# Patient Record
Sex: FEMALE | Race: WHITE | NOT HISPANIC OR LATINO | Employment: OTHER | URBAN - METROPOLITAN AREA
[De-identification: names, ages, dates, MRNs, and addresses within clinical notes are randomized per-mention and may not be internally consistent; named-entity substitution may affect disease eponyms.]

---

## 2017-02-16 ENCOUNTER — OFFICE VISIT (OUTPATIENT)
Dept: OBSTETRICS AND GYNECOLOGY | Facility: CLINIC | Age: 77
End: 2017-02-16

## 2017-02-16 VITALS
WEIGHT: 115.2 LBS | DIASTOLIC BLOOD PRESSURE: 70 MMHG | SYSTOLIC BLOOD PRESSURE: 124 MMHG | BODY MASS INDEX: 21.75 KG/M2 | HEIGHT: 61 IN

## 2017-02-16 DIAGNOSIS — M85.80 OSTEOPENIA: ICD-10-CM

## 2017-02-16 DIAGNOSIS — Z78.0 MENOPAUSE: Primary | ICD-10-CM

## 2017-02-16 DIAGNOSIS — Z01.419 ENCOUNTER FOR GYNECOLOGICAL EXAMINATION WITHOUT ABNORMAL FINDING: ICD-10-CM

## 2017-02-16 DIAGNOSIS — N95.2 VAGINAL ATROPHY: ICD-10-CM

## 2017-02-16 PROCEDURE — 99397 PER PM REEVAL EST PAT 65+ YR: CPT | Performed by: OBSTETRICS & GYNECOLOGY

## 2017-02-16 RX ORDER — NITROGLYCERIN 0.4 MG/1
1 TABLET SUBLINGUAL AS NEEDED
Refills: 2 | COMMUNITY
Start: 2017-01-31

## 2017-02-16 NOTE — PROGRESS NOTES
"   Chief Complaint   Patient presents with   • Gynecologic Exam   • Spencer Crenshaw is a 77 y.o. year old  presenting to be seen for her annual exam.  This patient has been followed by Dr. Sukhjinder Lew.  She has previously had a vaginal hysterectomy, anterior-posterior repair in .  She is treated for vaginal atrophy, however she does not use her estrogen cream regularly.  She has a history of osteopenia of the hip, however we do not have any recent DEXA's available here.  She has bilateral breast implants.    SCREENING TESTS    Year 2012   Age                         PAP  Neg.                       HPV high risk                         Mammogram    benign                     RONY score                         Breast MRI                         Lipids                         Vitamin D                         Colonoscopy                         DEXA  Frax (hip/any)    \"osteopenia\"                     Ovarian Screen                           Enter the month test was performed.  If month not known, enter \"X'  · Black numbers = normal results  · Red numbers = abnormal results  · Black X = patient reported normal  · Red X - patient reported abnormal      Referred by:    Profession:    Other info:         History   Sexual Activity   • Sexual activity: Yes   • Partners: Male   • Birth control/ protection: Post-menopausal, Surgical    She would not like to be screened for STD's at today's exam.     She exercises regularly: yes.  She wears her seat belt: yes.  She has concerns about domestic violence: no.  She has noticed changes in height: no    GYN screening history:  · Last mammogram: was done on approximately 10/16/2015 and the result was: Birads II (Benign findings)..    No Additional Complaints Reported    The following portions of the patient's history were reviewed and updated as " "appropriate:vital signs and   She  does not have any pertinent problems on file.  She  has a past surgical history that includes Adenoidectomy; Tubal ligation; Cataract extraction; Vaginal hysterectomy w/ anterior and posterior vaginal repair; Tonsillectomy; and Breast Augmentation (Bilateral).  Her family history includes Arthritis in her father and mother; Diabetes in her paternal grandfather; Heart attack in her father.  She  reports that she has never smoked. She has never used smokeless tobacco. She reports that she does not drink alcohol or use illicit drugs.  Current Outpatient Prescriptions   Medication Sig Dispense Refill   • conjugated estrogens (PREMARIN) 0.625 MG/GM vaginal cream Insert 0.5 application into the vagina 2 (Two) Times a Week. 30 g 3   • aspirin 81 MG EC tablet Take 81 mg by mouth Daily.     • desmopressin (DDAVP) 0.2 MG tablet      • hydrocortisone (CORTEF) 5 MG tablet      • levothyroxine (SYNTHROID, LEVOTHROID) 50 MCG tablet      • nitroglycerin (NITROSTAT) 0.4 MG SL tablet Place 1 tablet under the tongue As Needed.  2   • omeprazole (priLOSEC) 40 MG capsule      • potassium chloride (MICRO-K) 10 MEQ CR capsule      • verapamil PM (VERELAN PM) 180 MG 24 hr capsule      • ZENPEP 69418 UNITS capsule delayed-release particles capsule        No current facility-administered medications for this visit.      She has No Known Allergies..    Review of Systems  A comprehensive review of systems was negative.  Constitutional: negative for fever, chills, activity change, appetite change, fatigue and unexpected weight change.  Respiratory: negative  Cardiovascular: negative  Gastrointestinal: negative  Genitourinary:negative  Musculoskeletal:negative  Behavioral/Psych: negative       Visit Vitals   • /70   • Ht 61\" (154.9 cm)   • Wt 115 lb 3.2 oz (52.3 kg)   • LMP  (LMP Unknown)   • BMI 21.77 kg/m2       Physical Exam    General:  alert; cooperative; well developed; well nourished   Skin:  No " suspicious lesions seen   Thyroid: normal to inspection and palpation   Lungs:  clear to auscultation bilaterally   Heart:  regular rate and rhythm, S1, S2 normal, no murmur, click, rub or gallop   Breasts:  Examined in supine position  Nipples normal without inversion, lesions or discharge  There are no palpable axillary nodes  Bilateral implants are noted without obvious palpable abnormalities  there are protrusions from the implants on the left at 11:00 and 1:00   Abdomen: soft, non-tender; no masses  no umbilical or inginual hernias are present  no hepato-splenomegaly   Pelvis: Clinical staff was present for exam  External genitalia:  normal appearance of the external genitalia including Bartholin's and Herald's glands.  Vaginal:  atrophic mucosal changes are present;  Cervix:  absent.  Uterus:  absent.  Adnexa:  non palpable bilaterally.  Rectal:  anus visually normal appearing. recto-vaginal exam unremarkable and confirms findings;     Lab Review   No data reviewed    Imaging  Mammogram results         ASSESSMENT  Problems Addressed this Visit        Musculoskeletal and Integument    Osteopenia       Genitourinary    Menopause - Primary    Vaginal atrophy    Relevant Medications    conjugated estrogens (PREMARIN) 0.625 MG/GM vaginal cream          PLAN    Medications prescribed this encounter:    New Medications Ordered This Visit   Medications   • nitroglycerin (NITROSTAT) 0.4 MG SL tablet     Sig: Place 1 tablet under the tongue As Needed.     Refill:  2   • conjugated estrogens (PREMARIN) 0.625 MG/GM vaginal cream     Sig: Insert 0.5 application into the vagina 2 (Two) Times a Week.     Dispense:  30 g     Refill:  3   ·   · Calcium, 600 mg/ Vit. D, 400 IU daily; regular weight-bearing exercise  · Follow up: 12 month(s)  *Please note that portions of this documentation may have been completed with a voice recognition program.  Efforts were made to edit this dictation, but occasional words may have been  mistranscribed.       This note was electronically signed.    LYNNE Muro MD  February 16, 2017  10:23 AM

## 2018-04-17 ENCOUNTER — OFFICE VISIT (OUTPATIENT)
Dept: NEUROLOGY | Facility: CLINIC | Age: 78
End: 2018-04-17

## 2018-04-17 VITALS
SYSTOLIC BLOOD PRESSURE: 116 MMHG | HEIGHT: 61 IN | WEIGHT: 115 LBS | DIASTOLIC BLOOD PRESSURE: 75 MMHG | BODY MASS INDEX: 21.71 KG/M2

## 2018-04-17 DIAGNOSIS — G44.229 CHRONIC TENSION-TYPE HEADACHE, NOT INTRACTABLE: Primary | ICD-10-CM

## 2018-04-17 PROCEDURE — 99204 OFFICE O/P NEW MOD 45 MIN: CPT | Performed by: PSYCHIATRY & NEUROLOGY

## 2018-04-17 RX ORDER — AMITRIPTYLINE HYDROCHLORIDE 10 MG/1
10 TABLET, FILM COATED ORAL NIGHTLY
Qty: 30 TABLET | Refills: 1 | Status: SHIPPED | OUTPATIENT
Start: 2018-04-17 | End: 2018-04-18 | Stop reason: SDUPTHER

## 2018-04-17 NOTE — PROGRESS NOTES
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  She reports that she has had the headaches associated with the dehydration caused by diabetes insipidus for more than 10 years but since last 1 year, after the diagnosis of bacteremia meningitis she has started having new type of headache that has become progressively worse in intensity, frequency and duration.  She reports that the headache feels like there is a wrap around her head, it is of dull aching type of pain with maximum intensity being 8 out of 10.  Current frequency is 25 headache days in a month with some headaches lasting for more than 3-4 hours.  She reports that she wakes up with dull headache and then it will progress to become more intense as the day goes on.  She reports minimal photosensitivity and no sensitivity to sound or nausea with these headaches.  She has tried Tylenol in the past for really bad headache and it usually helps.  She had to go to the ER a few weeks ago because headache became a really bad and underwent brain imaging which was unremarkable.  She was given IV medications which helped with the headache.  She does report poor sleep quality and irritable mood intermittently.  Because of the headache, she is not able to perform a routine physical activities as well as exercise.    The following portions of the patient's history were reviewed today and updated as appropriate: allergies, current medications, past family history, past medical history, past social history, past surgical history and problem list.  This document will be scanned to patient's chart.    Review of  "Systems   Constitutional: Positive for activity change and fatigue.   Eyes: Positive for pain and redness.   Respiratory: Negative.    Cardiovascular: Negative.    Musculoskeletal: Positive for back pain, myalgias, neck pain and neck stiffness.   Skin: Negative.    Allergic/Immunologic: Negative.    Neurological: Positive for dizziness, speech difficulty, light-headedness, numbness and headaches.   Hematological: Bruises/bleeds easily.   Psychiatric/Behavioral: Positive for sleep disturbance.     Objective:    /75   Ht 154 cm (60.63\")   Wt 52.2 kg (115 lb)   LMP  (LMP Unknown)   BMI 22.00 kg/m²     Neurology Exam:  General apperance: NAD.     Mental status: Alert, awake and oriented to time place and person.    Recent and Remote memory: Can recall 3/3 objects at 5 minutes. Can recall historical events.     Attention span and Concentration: Serial 7s: Normal.     Fund of knowledge:  Normal.     Language and Speech: No aphasia or dysarthria.    Naming , Repitition and Comprehension:  Can name objects, repeat a sentence and follow commands. Speech is clear and fluent with good repetition, comprehension, and naming.    CN II to XII: Intact.    Opthalmoscopic Exam: No papilledema.    Motor:  Right UE muscle strength 5/5. Normal tone.     Left UE muscle strength 5/5. Normal tone.      Right LE muscle strength5/5. Normal tone.     Left LE muscle strength 5/5. Normal tone.      Sensory: Normal light touch, vibration and pinprick sensation bilaterally.    DTRs: 2+ bilaterally.    Babinski: Negative bilaterally.    Co-ordination: Normal finger-to-nose, heel to shin B/L.    Rhomberg: Negative.    Gait: Normal.    Cardiovascular: Regular rate and rhythm without murmur, gallop or rub.    Assessment and Plan:  1. Chronic tension-type headache, not intractable  Start low-dose amitriptyline 5 mg at bedtime for 1 week and if no response then increase it to 10 mg at bedtime as a preventative therapy. This should help with " the sleep and mood as well.   It can be increased further on next follow-up visit based on the response.  Tylenol 500-1000 milligram as needed as an abortive therapy.     Return in about 3 weeks (around 5/8/2018).

## 2018-04-18 RX ORDER — AMITRIPTYLINE HYDROCHLORIDE 10 MG/1
10 TABLET, FILM COATED ORAL NIGHTLY
Qty: 30 TABLET | Refills: 1 | Status: SHIPPED | OUTPATIENT
Start: 2018-04-18 | End: 2018-06-19 | Stop reason: SINTOL

## 2018-05-08 ENCOUNTER — OFFICE VISIT (OUTPATIENT)
Dept: NEUROLOGY | Facility: CLINIC | Age: 78
End: 2018-05-08

## 2018-05-08 VITALS
SYSTOLIC BLOOD PRESSURE: 117 MMHG | HEIGHT: 61 IN | BODY MASS INDEX: 21.52 KG/M2 | DIASTOLIC BLOOD PRESSURE: 75 MMHG | WEIGHT: 114 LBS

## 2018-05-08 DIAGNOSIS — G44.229 CHRONIC TENSION-TYPE HEADACHE, NOT INTRACTABLE: Primary | ICD-10-CM

## 2018-05-08 PROCEDURE — 99213 OFFICE O/P EST LOW 20 MIN: CPT | Performed by: PSYCHIATRY & NEUROLOGY

## 2018-05-08 RX ORDER — PANCRELIPASE 36000; 180000; 114000 [USP'U]/1; [USP'U]/1; [USP'U]/1
CAPSULE, DELAYED RELEASE PELLETS ORAL
COMMUNITY
Start: 2018-05-05

## 2018-05-08 RX ORDER — POTASSIUM CHLORIDE 750 MG/1
CAPSULE, EXTENDED RELEASE ORAL
COMMUNITY
Start: 2018-05-02 | End: 2022-03-11

## 2018-05-08 NOTE — PROGRESS NOTES
Subjective:    CC: Larissa Crenshaw is in clinic today for regular follow up.     HPI:  Patient is in the clinic for regular follow-up.  Since her last visit, she reports that she did start amitriptyline 5 mg at bedtime and it the reduced her headaches to 50% in intensity and frequency.  However, it made her very tired and fatigued.  She also felt that she was retaining fluid and felt bloated.  It also improved her sleep quality.  She had to stop taking it to the 2-3 days ago because of the side effects.  Since stopping the medication though, the headaches have not become worse.  She is currently happy with the way things are going and would not like to restart the medication.    The following portions of the patient's history were reviewed and updated as of 05/08/2018 : allergies, social history and problem list.       Current Outpatient Prescriptions:   •  amitriptyline (ELAVIL) 10 MG tablet, Take 1 tablet by mouth Every Night. Take 1/2 tab at night for 1 week and if no response then increase it to 1 tab at night., Disp: 30 tablet, Rfl: 1  •  aspirin 81 MG EC tablet, Take 81 mg by mouth Daily., Disp: , Rfl:   •  conjugated estrogens (PREMARIN) 0.625 MG/GM vaginal cream, Insert 0.5 application into the vagina 2 (Two) Times a Week., Disp: 30 g, Rfl: 3  •  CREON 70098 units capsule delayed-release particles, , Disp: , Rfl:   •  desmopressin (DDAVP) 0.2 MG tablet, , Disp: , Rfl:   •  hydrocortisone (CORTEF) 5 MG tablet, , Disp: , Rfl:   •  levothyroxine (SYNTHROID, LEVOTHROID) 50 MCG tablet, , Disp: , Rfl:   •  nitroglycerin (NITROSTAT) 0.4 MG SL tablet, Place 1 tablet under the tongue As Needed., Disp: , Rfl: 2  •  omeprazole (priLOSEC) 40 MG capsule, , Disp: , Rfl:   •  potassium chloride (MICRO-K) 10 MEQ CR capsule, , Disp: , Rfl:   •  verapamil PM (VERELAN PM) 180 MG 24 hr capsule, , Disp: , Rfl:   •  ZENPEP 80941 UNITS capsule delayed-release particles capsule, , Disp: , Rfl:    Past Medical History:   Diagnosis Date  "  • Arthritis    • Diabetes insipidus    • GERD (gastroesophageal reflux disease)    • Headache    • Hypothyroidism    • Menopause    • Osteopenia    • Pancreatitis    • Renal insufficiency       Past Surgical History:   Procedure Laterality Date   • ADENOIDECTOMY     • BREAST AUGMENTATION Bilateral    • CATARACT EXTRACTION     • TONSILLECTOMY     • TUBAL ABDOMINAL LIGATION     • VAGINAL HYSTERECTOMY W/ ANTERIOR AND POSTERIOR VAGINAL REPAIR        Family History   Problem Relation Age of Onset   • Arthritis Mother    • Arthritis Father    • Heart attack Father    • Diabetes Paternal Grandfather         Review of Systems   Constitutional: Positive for activity change.   Respiratory: Negative.    Cardiovascular: Negative.    Genitourinary: Positive for difficulty urinating.   Musculoskeletal: Positive for neck pain.   Neurological: Positive for dizziness and headache.   Psychiatric/Behavioral: Positive for sleep disturbance.     Objective:    /75   Ht 154 cm (60.63\")   Wt 51.7 kg (114 lb)   LMP  (LMP Unknown)   BMI 21.80 kg/m²     Neurology Exam:  General apperance: NAD.     Mental status: Alert, awake and oriented to time place and person.    Recent and Remote memory: Can recall 3/3 objects at 5 minutes. Can recall historical events.     Attention span and Concentration: Serial 7s: Normal.     Fund of knowledge:  Normal.     Language and Speech: No aphasia or dysarthria.    Naming , Repitition and Comprehension:  Can name objects, repeat a sentence and follow commands. Speech is clear and fluent with good repetition, comprehension, and naming.    CN II to XII: Intact.    Opthalmoscopic Exam: No papilledema.    Motor:  Right UE muscle strength 5/5. Normal tone.     Left UE muscle strength 5/5. Normal tone.      Right LE muscle strength5/5. Normal tone.     Left LE muscle strength 5/5. Normal tone.      Sensory: Normal light touch, vibration and pinprick sensation bilaterally.    DTRs: 2+ " bilaterally.    Babinski: Negative bilaterally.    Co-ordination: Normal finger-to-nose, heel to shin B/L.    Rhomberg: Negative.    Gait: Normal.    Cardiovascular: Regular rate and rhythm without murmur, gallop or rub.    Assessment and Plan:  1. Chronic tension-type headache, not intractable  She responded very well to low-dose amitriptyline 5 mg at bedtime and headache frequency and intensity went down by 50%.  However, she developed side effects including fatigue , tiredness and had to stop medication 2-3 days ago.  Headaches have not become worse since stopping the medication and she would like to see how she does off the medication.  I have advised her to call office in case if headaches become worse and different preventive medication can be considered.  I'll see her back in 6 weeks in reassessment.       Return in about 6 weeks (around 6/19/2018).

## 2018-06-19 ENCOUNTER — OFFICE VISIT (OUTPATIENT)
Dept: NEUROLOGY | Facility: CLINIC | Age: 78
End: 2018-06-19

## 2018-06-19 VITALS
HEIGHT: 61 IN | DIASTOLIC BLOOD PRESSURE: 84 MMHG | WEIGHT: 114 LBS | SYSTOLIC BLOOD PRESSURE: 127 MMHG | BODY MASS INDEX: 21.52 KG/M2

## 2018-06-19 DIAGNOSIS — G44.229 CHRONIC TENSION-TYPE HEADACHE, NOT INTRACTABLE: Primary | ICD-10-CM

## 2018-06-19 PROCEDURE — 99213 OFFICE O/P EST LOW 20 MIN: CPT | Performed by: PSYCHIATRY & NEUROLOGY

## 2018-06-19 NOTE — PROGRESS NOTES
Subjective:    CC: Larissa Crenshaw is in clinic today for follow up for  chronic tension type of headaches.    HPI:  Patient is in the clinic for regular follow-up.  Since her last visit, she reports that she has had the 2 bad headaches for which she had to take over-the-counter pain now medication.  She reports that however, overall headache frequency and intensity is much better.  She remains off of amitriptyline.  Amitriptyline low dose caused her to have sleepiness and tiredness and she could not to tolerate the medication.  She does not want to consider any other the headache preventive medication at this point in time.    The following portions of the patient's history were reviewed and updated as of 06/19/2018: allergies, social history and problem list.       Current Outpatient Prescriptions:   •  aspirin 81 MG EC tablet, Take 81 mg by mouth Daily., Disp: , Rfl:   •  conjugated estrogens (PREMARIN) 0.625 MG/GM vaginal cream, Insert 0.5 application into the vagina 2 (Two) Times a Week., Disp: 30 g, Rfl: 3  •  CREON 18062 units capsule delayed-release particles, , Disp: , Rfl:   •  desmopressin (DDAVP) 0.2 MG tablet, , Disp: , Rfl:   •  hydrocortisone (CORTEF) 5 MG tablet, , Disp: , Rfl:   •  levothyroxine (SYNTHROID, LEVOTHROID) 50 MCG tablet, , Disp: , Rfl:   •  nitroglycerin (NITROSTAT) 0.4 MG SL tablet, Place 1 tablet under the tongue As Needed., Disp: , Rfl: 2  •  omeprazole (priLOSEC) 40 MG capsule, , Disp: , Rfl:   •  potassium chloride (MICRO-K) 10 MEQ CR capsule, , Disp: , Rfl:   •  verapamil PM (VERELAN PM) 180 MG 24 hr capsule, , Disp: , Rfl:   •  ZENPEP 27642 UNITS capsule delayed-release particles capsule, , Disp: , Rfl:    Past Medical History:   Diagnosis Date   • Arthritis    • Diabetes insipidus    • GERD (gastroesophageal reflux disease)    • Headache    • Hypothyroidism    • Menopause    • Osteopenia    • Pancreatitis    • Renal insufficiency       Past Surgical History:   Procedure Laterality  "Date   • ADENOIDECTOMY     • BREAST AUGMENTATION Bilateral    • CATARACT EXTRACTION     • TONSILLECTOMY     • TUBAL ABDOMINAL LIGATION     • VAGINAL HYSTERECTOMY W/ ANTERIOR AND POSTERIOR VAGINAL REPAIR        Family History   Problem Relation Age of Onset   • Arthritis Mother    • Arthritis Father    • Heart attack Father    • Diabetes Paternal Grandfather         Review of Systems   Eyes: Positive for redness and itching.   Gastrointestinal: Positive for abdominal pain.   Endocrine: Positive for polyphagia.   Neurological: Positive for headache.   Psychiatric/Behavioral: Positive for sleep disturbance.     Objective:    /84   Ht 154.9 cm (60.98\")   Wt 51.7 kg (114 lb)   LMP  (LMP Unknown)   BMI 21.55 kg/m²     Neurology Exam:  General apperance: NAD.     Mental status: Alert, awake and oriented to time place and person.    Recent and Remote memory: Can recall 3/3 objects at 5 minutes. Can recall historical events.     Attention span and Concentration: Serial 7s: Normal.     Fund of knowledge:  Normal.     Language and Speech: No aphasia or dysarthria.    Naming , Repitition and Comprehension:  Can name objects, repeat a sentence and follow commands. Speech is clear and fluent with good repetition, comprehension, and naming.    CN II to XII: Intact.    Opthalmoscopic Exam: No papilledema.    Motor:  Right UE muscle strength 5/5. Normal tone.     Left UE muscle strength 5/5. Normal tone.      Right LE muscle strength5/5. Normal tone.     Left LE muscle strength 5/5. Normal tone.      Sensory: Normal light touch, vibration and pinprick sensation bilaterally.    DTRs: 2+ bilaterally.    Babinski: Negative bilaterally.    Co-ordination: Normal finger-to-nose, heel to shin B/L.    Rhomberg: Negative.    Gait: Normal.    Cardiovascular: Regular rate and rhythm without murmur, gallop or rub.    Assessment and Plan:  1. Chronic tension-type headache, not intractable  Headaches under good control at present.  She " reports that 2 bad headaches in last 6 weeks for which she took over-the-counter pain medication and that helped resolve the headache.  She could not tolerate amitriptyline in the past and hence she is not taking it.  She does not want to consider starting any new medication for headache prevention at this point in time.    I spent 15 minutes face to face with the patient and spent 10 minutes of this time counseling and discussing importance of good hydration, good sleep hygiene and regular exercise.    Return if symptoms worsen or fail to improve.

## 2019-07-08 ENCOUNTER — OFFICE VISIT (OUTPATIENT)
Dept: NEUROLOGY | Facility: CLINIC | Age: 79
End: 2019-07-08

## 2019-07-08 VITALS
DIASTOLIC BLOOD PRESSURE: 80 MMHG | WEIGHT: 114 LBS | SYSTOLIC BLOOD PRESSURE: 104 MMHG | BODY MASS INDEX: 21.52 KG/M2 | HEIGHT: 61 IN

## 2019-07-08 DIAGNOSIS — G43.109 COMPLICATED MIGRAINE: Primary | ICD-10-CM

## 2019-07-08 PROCEDURE — 99213 OFFICE O/P EST LOW 20 MIN: CPT | Performed by: PSYCHIATRY & NEUROLOGY

## 2019-07-08 RX ORDER — ELETRIPTAN HYDROBROMIDE 40 MG/1
40 TABLET, FILM COATED ORAL AS NEEDED
Qty: 9 TABLET | Refills: 3 | Status: SHIPPED | OUTPATIENT
Start: 2019-07-08 | End: 2020-07-07

## 2019-07-08 NOTE — PROGRESS NOTES
Subjective:    CC: Larissa Crenshaw is in clinic today for follow up for headache.    HPI:  She is in clinic for follow-up as she has experienced to really intense headaches.  First one was about 4 to 5 weeks ago where the symptoms started with blurred vision and then she had intense headache involving the frontal region associated with mild sensitivity to light.  The headache lasted for 3 to 4 hours total.  She took Tylenol but it did not help.  2 weeks later, while she was working, she started having difficulty with speech which was followed by intense headache lasting for 3 to 4 hours.  Overall, since the last visit, she reports that the headaches are under very good control until recently when she has started noticing recurrence of headaches.    The following portions of the patient's history were reviewed and updated as of 07/08/2019: allergies, social history and problem list.       Current Outpatient Medications:   •  aspirin 81 MG EC tablet, Take 81 mg by mouth Daily., Disp: , Rfl:   •  conjugated estrogens (PREMARIN) 0.625 MG/GM vaginal cream, Insert 0.5 application into the vagina 2 (Two) Times a Week., Disp: 30 g, Rfl: 3  •  CREON 19698 units capsule delayed-release particles, , Disp: , Rfl:   •  desmopressin (DDAVP) 0.2 MG tablet, , Disp: , Rfl:   •  hydrocortisone (CORTEF) 5 MG tablet, , Disp: , Rfl:   •  levothyroxine (SYNTHROID, LEVOTHROID) 50 MCG tablet, , Disp: , Rfl:   •  nitroglycerin (NITROSTAT) 0.4 MG SL tablet, Place 1 tablet under the tongue As Needed., Disp: , Rfl: 2  •  omeprazole (priLOSEC) 40 MG capsule, , Disp: , Rfl:   •  potassium chloride (MICRO-K) 10 MEQ CR capsule, , Disp: , Rfl:   •  verapamil PM (VERELAN PM) 180 MG 24 hr capsule, , Disp: , Rfl:   •  eletriptan (RELPAX) 40 MG tablet, Take 1 tablet by mouth As Needed for Migraine., Disp: 9 tablet, Rfl: 3  •  ZENPEP 77949 UNITS capsule delayed-release particles capsule, , Disp: , Rfl:    Past Medical History:   Diagnosis Date   •  "Arthritis    • Diabetes insipidus (CMS/HCC)    • GERD (gastroesophageal reflux disease)    • Headache    • Hypothyroidism    • Menopause    • Osteopenia    • Pancreatitis    • Renal insufficiency       Past Surgical History:   Procedure Laterality Date   • ADENOIDECTOMY     • BREAST AUGMENTATION Bilateral    • CATARACT EXTRACTION     • TONSILLECTOMY     • TUBAL ABDOMINAL LIGATION     • VAGINAL HYSTERECTOMY W/ ANTERIOR AND POSTERIOR VAGINAL REPAIR        Family History   Problem Relation Age of Onset   • Arthritis Mother    • Arthritis Father    • Heart attack Father    • Diabetes Paternal Grandfather         Review of Systems   Constitutional: Positive for fatigue.   Eyes: Positive for redness and visual disturbance.   Endocrine: Positive for polydipsia.   Musculoskeletal: Positive for neck pain and neck stiffness.   Neurological: Positive for dizziness, light-headedness, headache and confusion.   Psychiatric/Behavioral: Positive for sleep disturbance.     Objective:    /80   Ht 154.7 cm (60.9\")   Wt 51.7 kg (114 lb)   LMP  (LMP Unknown)   BMI 21.61 kg/m²     Neurology Exam:  General apperance: NAD.     Mental status: Alert, awake and oriented to time place and person.    Recent and Remote memory: Can recall 3/3 objects at 5 minutes. Can recall historical events.     Attention span and Concentration: Serial 7s: Normal.     Fund of knowledge:  Normal.     Language and Speech: No aphasia or dysarthria.    Naming , Repitition and Comprehension:  Can name objects, repeat a sentence and follow commands. Speech is clear and fluent with good repetition, comprehension, and naming.    CN II to XII: Intact.    Opthalmoscopic Exam: No papilledema.    Motor:  Right UE muscle strength 5/5. Normal tone.     Left UE muscle strength 5/5. Normal tone.      Right LE muscle strength5/5. Normal tone.     Left LE muscle strength 5/5. Normal tone.      Sensory: Normal light touch, vibration and pinprick sensation " bilaterally.    DTRs: 2+ bilaterally.    Babinski: Negative bilaterally.    Co-ordination: Normal finger-to-nose, heel to shin B/L.    Rhomberg: Negative.    Gait: Normal.    Cardiovascular: Regular rate and rhythm without murmur, gallop or rub.    Assessment and Plan:  1. Complicated migraine  Patient with recent recurrence of headaches.  Initially, she had tension type of headaches but the last 2 headaches that she has had are likely complicated migraines.  Since they are infrequent at present, I will be prescribing Relpax 40 mg as needed to be taken at the onset of headache as an abortive therapy.  I have advised her to call office with response.  Otherwise, I will see her back in 3 months for follow-up.       I spent 15 minutes face to face with the patient and spent 10 minutes of this time  in management, instructions and education regarding above mentioned diagnosis and also on counseling and discussing about taking medication regularly, possible side effects with medication use, importance of good sleep hygiene, good hydration and regular exercise.    No Follow-up on file.

## 2020-10-09 RX ORDER — HYDROCORTISONE 5 MG/1
TABLET ORAL
Qty: 270 TABLET | Refills: 3 | Status: SHIPPED | OUTPATIENT
Start: 2020-10-09 | End: 2020-12-17 | Stop reason: SDUPTHER

## 2020-12-17 ENCOUNTER — OFFICE VISIT (OUTPATIENT)
Dept: ENDOCRINOLOGY | Facility: CLINIC | Age: 80
End: 2020-12-17

## 2020-12-17 ENCOUNTER — LAB (OUTPATIENT)
Dept: LAB | Facility: HOSPITAL | Age: 80
End: 2020-12-17

## 2020-12-17 VITALS
BODY MASS INDEX: 20.77 KG/M2 | TEMPERATURE: 96.9 F | WEIGHT: 110 LBS | DIASTOLIC BLOOD PRESSURE: 60 MMHG | SYSTOLIC BLOOD PRESSURE: 90 MMHG | HEIGHT: 61 IN

## 2020-12-17 DIAGNOSIS — E27.1 ADDISON DISEASE (HCC): ICD-10-CM

## 2020-12-17 DIAGNOSIS — E23.2 DIABETES INSIPIDUS (HCC): ICD-10-CM

## 2020-12-17 DIAGNOSIS — E03.9 ACQUIRED HYPOTHYROIDISM: ICD-10-CM

## 2020-12-17 DIAGNOSIS — E03.9 ACQUIRED HYPOTHYROIDISM: Primary | ICD-10-CM

## 2020-12-17 LAB
ALBUMIN SERPL-MCNC: 4.5 G/DL (ref 3.5–5.2)
ALBUMIN/GLOB SERPL: 2 G/DL
ALP SERPL-CCNC: 57 U/L (ref 39–117)
ALT SERPL W P-5'-P-CCNC: 11 U/L (ref 1–33)
ANION GAP SERPL CALCULATED.3IONS-SCNC: 10.5 MMOL/L (ref 5–15)
AST SERPL-CCNC: 15 U/L (ref 1–32)
BILIRUB SERPL-MCNC: 0.8 MG/DL (ref 0–1.2)
BUN SERPL-MCNC: 15 MG/DL (ref 8–23)
BUN/CREAT SERPL: 28.3 (ref 7–25)
CALCIUM SPEC-SCNC: 9.2 MG/DL (ref 8.6–10.5)
CHLORIDE SERPL-SCNC: 102 MMOL/L (ref 98–107)
CO2 SERPL-SCNC: 24.5 MMOL/L (ref 22–29)
CREAT SERPL-MCNC: 0.53 MG/DL (ref 0.57–1)
GFR SERPL CREATININE-BSD FRML MDRD: 112 ML/MIN/1.73
GLOBULIN UR ELPH-MCNC: 2.2 GM/DL
GLUCOSE SERPL-MCNC: 87 MG/DL (ref 65–99)
POTASSIUM SERPL-SCNC: 4.3 MMOL/L (ref 3.5–5.2)
PROT SERPL-MCNC: 6.7 G/DL (ref 6–8.5)
SODIUM SERPL-SCNC: 137 MMOL/L (ref 136–145)
T4 FREE SERPL-MCNC: 1.22 NG/DL (ref 0.93–1.7)
TSH SERPL DL<=0.05 MIU/L-ACNC: 1.09 UIU/ML (ref 0.27–4.2)

## 2020-12-17 PROCEDURE — 80053 COMPREHEN METABOLIC PANEL: CPT

## 2020-12-17 PROCEDURE — 99214 OFFICE O/P EST MOD 30 MIN: CPT | Performed by: INTERNAL MEDICINE

## 2020-12-17 PROCEDURE — 84443 ASSAY THYROID STIM HORMONE: CPT

## 2020-12-17 PROCEDURE — 84439 ASSAY OF FREE THYROXINE: CPT

## 2020-12-17 RX ORDER — LEVOTHYROXINE SODIUM 0.05 MG/1
50 TABLET ORAL DAILY
Qty: 90 TABLET | Refills: 3 | Status: SHIPPED | OUTPATIENT
Start: 2020-12-17 | End: 2022-02-07

## 2020-12-17 RX ORDER — HYDROCORTISONE 5 MG/1
TABLET ORAL
Qty: 270 TABLET | Refills: 3 | Status: SHIPPED | OUTPATIENT
Start: 2020-12-17 | End: 2022-02-07

## 2020-12-17 RX ORDER — DESMOPRESSIN ACETATE 0.2 MG/1
0.2 TABLET ORAL DAILY
Qty: 90 TABLET | Refills: 3 | Status: SHIPPED | OUTPATIENT
Start: 2020-12-17 | End: 2022-03-03

## 2020-12-17 RX ORDER — HYDROCORTISONE 10 MG/1
1 TABLET ORAL DAILY
COMMUNITY
Start: 2020-09-09 | End: 2020-12-17

## 2020-12-17 NOTE — PROGRESS NOTES
"     Office Note      Date: 2020  Patient Name: Larissa Crenshaw  MRN: [id number]  : 1940    Chief Complaint   Patient presents with   • Follow-up       History of Present Illness:   Larissa Crenshaw is a 80 y.o. female who presents for Follow-up  she has 3 endocrine disorders-- hypothyroidism, jose miguel's disease and  DI. All are treated.  And have been reasonably stable.  She noted loss of muscle mass since she cannot work out.  She notes a lower blood pressure.  Energy level is good but less than is had been.    She notes no increased thirst.  She still has nocturia- new in the last 2 months but not more than once per night.      Subjective        Review of Systems:   Review of Systems   Constitutional: Positive for fatigue and unexpected weight change.   Eyes: Negative.    Respiratory: Negative.    Cardiovascular: Negative.    Gastrointestinal: Negative.    Endocrine: Positive for polyuria.   Genitourinary: Negative.    Musculoskeletal: Negative.    Skin: Negative.    Allergic/Immunologic: Negative.    Neurological: Positive for headaches.   Hematological: Negative.    Psychiatric/Behavioral: Negative.        The following portions of the patient's history were reviewed and updated as appropriate: allergies, current medications, past family history, past medical history, past social history, past surgical history and problem list.    Objective     Visit Vitals  BP 90/60   Temp 96.9 °F (36.1 °C) (Infrared)   Ht 154.9 cm (61\")   Wt 49.9 kg (110 lb)   LMP  (LMP Unknown)   BMI 20.78 kg/m²       Labs:        Physical Exam:  Physical Exam  Vitals signs reviewed.   Constitutional:       Appearance: Normal appearance.   HENT:      Head: Normocephalic and atraumatic.   Neurological:      General: No focal deficit present.      Mental Status: She is alert. Mental status is at baseline.   Psychiatric:         Mood and Affect: Mood normal.         Thought Content: Thought content normal.         Judgment: Judgment " normal.         Assessment / Plan      Assessment & Plan:  Problem List Items Addressed This Visit        Endocrine    Hypothyroidism - Primary    Current Assessment & Plan     Clinically euthyroid. tft's ordered. Thyroid medication refilled          Relevant Medications    hydrocortisone (CORTEF) 5 MG tablet    levothyroxine (SYNTHROID, LEVOTHROID) 50 MCG tablet    Other Relevant Orders    TSH    T4, Free    Diabetes insipidus (CMS/[facility])    Current Assessment & Plan     Having some break through polyuria          Relevant Medications    desmopressin (DDAVP) 0.2 MG tablet    Other Relevant Orders    Comprehensive Metabolic Panel    [place] disease (CMS/[facility])    Current Assessment & Plan     bp is rather low.. might  Need more meds          Relevant Medications    hydrocortisone (CORTEF) 5 MG tablet    Other Relevant Orders    Comprehensive Metabolic Panel           Edson Mendez MD   12/17/2020

## 2021-06-23 ENCOUNTER — LAB (OUTPATIENT)
Dept: LAB | Facility: HOSPITAL | Age: 81
End: 2021-06-23

## 2021-06-23 ENCOUNTER — OFFICE VISIT (OUTPATIENT)
Dept: ENDOCRINOLOGY | Facility: CLINIC | Age: 81
End: 2021-06-23

## 2021-06-23 VITALS
HEART RATE: 74 BPM | BODY MASS INDEX: 20.01 KG/M2 | DIASTOLIC BLOOD PRESSURE: 68 MMHG | WEIGHT: 106 LBS | HEIGHT: 61 IN | OXYGEN SATURATION: 98 % | SYSTOLIC BLOOD PRESSURE: 136 MMHG

## 2021-06-23 DIAGNOSIS — E27.1 ADDISON DISEASE (HCC): ICD-10-CM

## 2021-06-23 DIAGNOSIS — E23.2 DIABETES INSIPIDUS (HCC): ICD-10-CM

## 2021-06-23 DIAGNOSIS — E23.2 DIABETES INSIPIDUS (HCC): Primary | ICD-10-CM

## 2021-06-23 DIAGNOSIS — M85.88 OSTEOPENIA OF LUMBAR SPINE: ICD-10-CM

## 2021-06-23 DIAGNOSIS — E03.9 ACQUIRED HYPOTHYROIDISM: ICD-10-CM

## 2021-06-23 LAB
ALBUMIN SERPL-MCNC: 4.7 G/DL (ref 3.5–5.2)
ALBUMIN/GLOB SERPL: 2.1 G/DL
ALP SERPL-CCNC: 67 U/L (ref 39–117)
ALT SERPL W P-5'-P-CCNC: 12 U/L (ref 1–33)
ANION GAP SERPL CALCULATED.3IONS-SCNC: 9.2 MMOL/L (ref 5–15)
AST SERPL-CCNC: 14 U/L (ref 1–32)
BILIRUB SERPL-MCNC: 0.8 MG/DL (ref 0–1.2)
BUN SERPL-MCNC: 15 MG/DL (ref 8–23)
BUN/CREAT SERPL: 21.7 (ref 7–25)
CALCIUM SPEC-SCNC: 9.5 MG/DL (ref 8.6–10.5)
CHLORIDE SERPL-SCNC: 104 MMOL/L (ref 98–107)
CO2 SERPL-SCNC: 26.8 MMOL/L (ref 22–29)
CREAT SERPL-MCNC: 0.69 MG/DL (ref 0.57–1)
GFR SERPL CREATININE-BSD FRML MDRD: 82 ML/MIN/1.73
GLOBULIN UR ELPH-MCNC: 2.2 GM/DL
GLUCOSE SERPL-MCNC: 95 MG/DL (ref 65–99)
POTASSIUM SERPL-SCNC: 4.5 MMOL/L (ref 3.5–5.2)
PROT SERPL-MCNC: 6.9 G/DL (ref 6–8.5)
SODIUM SERPL-SCNC: 140 MMOL/L (ref 136–145)
T4 FREE SERPL-MCNC: 1.48 NG/DL (ref 0.93–1.7)
TSH SERPL DL<=0.05 MIU/L-ACNC: 1.15 UIU/ML (ref 0.27–4.2)

## 2021-06-23 PROCEDURE — 84439 ASSAY OF FREE THYROXINE: CPT

## 2021-06-23 PROCEDURE — 80053 COMPREHEN METABOLIC PANEL: CPT

## 2021-06-23 PROCEDURE — 84443 ASSAY THYROID STIM HORMONE: CPT

## 2021-06-23 PROCEDURE — 99214 OFFICE O/P EST MOD 30 MIN: CPT | Performed by: INTERNAL MEDICINE

## 2021-06-23 RX ORDER — MONTELUKAST SODIUM 10 MG/1
10 TABLET ORAL DAILY
COMMUNITY
Start: 2021-04-10 | End: 2022-03-11

## 2021-06-23 RX ORDER — ALENDRONATE SODIUM 70 MG/1
70 TABLET ORAL
COMMUNITY

## 2021-06-23 RX ORDER — OMEGA-3S/DHA/EPA/FISH OIL/D3 300MG-1000
1 CAPSULE ORAL
COMMUNITY

## 2021-06-23 RX ORDER — AMIODARONE HYDROCHLORIDE 200 MG/1
200 TABLET ORAL DAILY
COMMUNITY
Start: 2021-06-14 | End: 2022-03-11

## 2021-06-23 NOTE — PROGRESS NOTES
"     Office Note      Date: 2021  Patient Name: Larissa Crenshaw  MRN: [id number]  : 1940    Chief Complaint   Patient presents with   • Hypothyroidism       History of Present Illness:   Larissa Crenshaw is a 81 y.o. female who presents for Hypothyroidism   SHE IS HERE FOR ROUTINE FOLLOW UP FOR     ADDISONS'  DI  AND  HYPOTHYROIDISM    SHE  IS ON TREATMENT FOR ALL THE ABOVE  SHE IS ON ALENDRONATE FOR THE LAST YEAR FOR OSTEOPOROSIS. SHE WANTS ME TO REFILL THAT MEDICATION. I  AM GLAD TO DO THAT. SHE WILL FIND OUT WHERE AND WHEN SHE HAD HER LAST BONE DENSITY.  ============================================  SHE  HAD 2 MI'S SINCE  LAST  TIME. SHE WILL BE HAVING AN ABLATION NEXT WEEK.  =================================================      Subjective          Review of Systems:   Review of Systems   Constitutional: Positive for fatigue and unexpected weight change.   Respiratory: Positive for shortness of breath.    Cardiovascular: Negative for leg swelling.   Endocrine: Positive for cold intolerance.   Musculoskeletal: Positive for arthralgias.       The following portions of the patient's history were reviewed and updated as appropriate: allergies, current medications, past family history, past medical history, past social history, past surgical history and problem list.    Objective     Visit Vitals  /68   Pulse 74   Ht 154.9 cm (61\")   Wt 48.1 kg (106 lb)   LMP  (LMP Unknown)   SpO2 98%   BMI 20.03 kg/m²       Labs:    CBC w/DIFF  No results found for: WBC, RBC, HGB, HCT, MCV, MCH, MCHC, RDW, RDWSD, MPV, PLT, NEUTRORELPCT, LYMPHORELPCT, MONORELPCT, EOSRELPCT, BASORELPCT, AUTOIGPER, NEUTROABS, LYMPHSABS, MONOSABS, EOSABS, BASOSABS, AUTOIGNUM, NRBC    T4  Free T4   Date Value Ref Range Status   2020 1.22 0.93 - 1.70 ng/dL Final       TSH  No results found for: TSHBASE     Physical Exam:  Physical Exam  Vitals reviewed.   Constitutional:       Appearance: Normal appearance.   Eyes:      Extraocular " Movements: Extraocular movements intact.   Neck:      Comments: NO GOITER  Lymphadenopathy:      Cervical: No cervical adenopathy.   Psychiatric:         Mood and Affect: Mood normal.         Thought Content: Thought content normal.         Judgment: Judgment normal.         Assessment / Plan      Assessment & Plan:  Problem List Items Addressed This Visit        Other    Osteopenia    Current Assessment & Plan     ON FOSAMAX. SHE WILL FIND OUT WHERE AND WHEN SHE HAD HER LAST BMD          Hypothyroidism    Current Assessment & Plan     CLINICALLY EUTHYROID BUT DUE FOR TSH          Relevant Medications    hydrocortisone (CORTEF) 5 MG tablet    levothyroxine (SYNTHROID, LEVOTHROID) 50 MCG tablet    Other Relevant Orders    TSH    T4, Free    Diabetes insipidus (CMS/HCC) - Primary    Current Assessment & Plan     STABLE ON MEDS BUT DUE FOR CMP         Relevant Medications    desmopressin (DDAVP) 0.2 MG tablet    Other Relevant Orders    Comprehensive Metabolic Panel    [place] disease (CMS/HCC)    Current Assessment & Plan     STABLE ON MEDS          Relevant Medications    hydrocortisone (CORTEF) 5 MG tablet    Other Relevant Orders    Comprehensive Metabolic Panel           Edson Mendez MD   06/23/2021

## 2022-01-03 ENCOUNTER — OFFICE VISIT (OUTPATIENT)
Dept: NEUROSURGERY | Facility: CLINIC | Age: 82
End: 2022-01-03

## 2022-01-03 VITALS
WEIGHT: 107 LBS | SYSTOLIC BLOOD PRESSURE: 124 MMHG | TEMPERATURE: 97.1 F | BODY MASS INDEX: 20.2 KG/M2 | DIASTOLIC BLOOD PRESSURE: 68 MMHG | HEIGHT: 61 IN

## 2022-01-03 DIAGNOSIS — M54.6 THORACIC BACK PAIN, UNSPECIFIED BACK PAIN LATERALITY, UNSPECIFIED CHRONICITY: Primary | ICD-10-CM

## 2022-01-03 PROCEDURE — 99204 OFFICE O/P NEW MOD 45 MIN: CPT | Performed by: NEUROLOGICAL SURGERY

## 2022-01-03 RX ORDER — POTASSIUM CHLORIDE 1500 MG/1
TABLET, EXTENDED RELEASE ORAL
COMMUNITY
Start: 2021-09-27

## 2022-01-03 RX ORDER — TRIAMCINOLONE ACETONIDE 1 MG/G
CREAM TOPICAL
COMMUNITY
Start: 2021-09-27

## 2022-01-03 RX ORDER — DONEPEZIL HYDROCHLORIDE 5 MG/1
5 TABLET, FILM COATED ORAL
COMMUNITY
Start: 2021-11-13

## 2022-01-03 RX ORDER — ACETAMINOPHEN 500 MG
500 TABLET ORAL AS NEEDED
COMMUNITY

## 2022-01-03 NOTE — PROGRESS NOTES
Subjective     Chief Complaint: Thoracic back pain    Patient ID: Larissa Crenshaw is a 81 y.o. female seen for consultation today at the request of  Maggie Bernard MD    History of Present Illness    This is a 81-year-old woman who presents to my office with chief complaints of a self-limited episode of left-sided thoracic back pain.  She received her Covid booster back in November.  Shortly thereafter she began to experience significant whole body myalgias.  This subsequently progressed to involve severe left-sided thoracic back pain and muscle spasms.  She ultimately underwent a MRI of the thoracic spine which disclosed some abnormalities.  Referral to my clinic was accordingly established.    Since this episode in late November, she reports that her symptoms have resolved.  She never had any symptoms radiating into her legs or buttocks, nor did she have any complaints that sound like thoracic radiculopathy.  She endorses her pain as consistent with a muscle spasm and localized to the left thoracic paraspinous musculature.  She does not have much in the way of medical comorbidities.  She enjoys walking for exercise.  Prior to the COVID-19 pandemic, she was going to [employer] on a regular basis, however she has really not undergone any formal exercise other than walking since the start of the pandemic back in 2020.    The following portions of the patient's history were reviewed and updated as appropriate: allergies, current medications, past family history, past medical history, past social history, past surgical history and problem list.    Family history:   Family History   Problem Relation Age of Onset   • Arthritis Mother    • Arthritis Father    • Heart attack Father    • Diabetes Paternal Grandfather        Social history:   Social History     Socioeconomic History   • Marital status:    Tobacco Use   • Smoking status: Never Smoker   • Smokeless tobacco: Never Used   Vaping Use   • Vaping Use:  "Never used   Substance and Sexual Activity   • Alcohol use: No   • Drug use: No   • Sexual activity: Yes     Partners: Male     Birth control/protection: Post-menopausal, Surgical       Review of Systems   Constitutional: Positive for activity change and fatigue.   HENT: Positive for dental problem.    Eyes: Negative.    Respiratory: Negative.    Cardiovascular: Negative.    Gastrointestinal: Negative.    Endocrine: Negative.    Genitourinary: Negative.    Musculoskeletal: Positive for back pain.   Skin: Negative.    Neurological: Positive for dizziness and light-headedness.   Hematological: Negative.    Psychiatric/Behavioral: Positive for sleep disturbance.       Objective   Blood pressure 124/68, temperature 97.1 °F (36.2 °C), temperature source Infrared, height 154.9 cm (61\"), weight 48.5 kg (107 lb).  Body mass index is 20.22 kg/m².    Physical Exam  Constitutional:       General: She is not in acute distress.     Appearance: She is well-developed. She is not diaphoretic.   HENT:      Head: Normocephalic and atraumatic.   Pulmonary:      Effort: Pulmonary effort is normal.   Musculoskeletal:      Thoracic back: Tenderness present. Decreased range of motion.      Comments: Somewhat tender to palpation over the mid/lower thoracic paraspinous musculature on the left side.   Skin:     General: Skin is warm and dry.   Neurological:      Mental Status: She is alert and oriented to person, place, and time.      Cranial Nerves: No cranial nerve deficit.      Comments: Casual gait is unremarkable.    Station is intact.         Assessment/Plan     Independent Review of Radiographic Studies:      She has a MRI of the thoracic spine which was performed on 11/29/2020.  For the most part, this is an unremarkable study showing only mild bordering on moderate degenerative changes.  There interpreting radiologist is reporting some moderate to severe left foraminal stenosis at 11-12, however this is difficult to interpret in my " opinion.  I do not appreciate any significant central canal stenosis or radiographic evidence of thoracic spinal cord compression.    Medical Decision Making:      From the standpoint of her thoracic back pain, there is no role for surgical intervention.  I would be happy to follow-up with her on an as-needed basis.  I carefully reviewed the signs and symptoms of thoracic radiculopathy, lumbosacral radiculopathy, and neurogenic claudication with her.  She does have some occasional complaints of leg pain and leg weakness, however at this sounds like it is longstanding and has not been getting progressively worse, nor does she seem particularly concerned by it at this point.    My recommendation is for her to continue to increase her physical activity and if she begins to experience signs/symptoms of thoracic or lumbosacral nerve root compression, which I carefully reviewed with her, I would be happy to follow-up with her.  I think the risk of morbidity at this point without intervention is very low.    Diagnoses and all orders for this visit:    1. Thoracic back pain, unspecified back pain laterality, unspecified chronicity (Primary)        No follow-ups on file.           This document signed by ROZINA Scott MD January 3, 2022 10:15 EST

## 2022-02-05 DIAGNOSIS — E27.1 ADDISON DISEASE: ICD-10-CM

## 2022-02-05 DIAGNOSIS — E03.9 ACQUIRED HYPOTHYROIDISM: ICD-10-CM

## 2022-02-07 RX ORDER — LEVOTHYROXINE SODIUM 0.05 MG/1
TABLET ORAL
Qty: 90 TABLET | Refills: 0 | Status: SHIPPED | OUTPATIENT
Start: 2022-02-07 | End: 2022-05-06

## 2022-02-07 RX ORDER — HYDROCORTISONE 5 MG/1
TABLET ORAL
Qty: 270 TABLET | Refills: 0 | Status: SHIPPED | OUTPATIENT
Start: 2022-02-07 | End: 2022-05-06

## 2022-03-02 DIAGNOSIS — E23.2 DIABETES INSIPIDUS: ICD-10-CM

## 2022-03-03 RX ORDER — DESMOPRESSIN ACETATE 0.2 MG/1
TABLET ORAL
Qty: 90 TABLET | Refills: 0 | Status: SHIPPED | OUTPATIENT
Start: 2022-03-03 | End: 2022-06-28

## 2022-03-11 ENCOUNTER — OFFICE VISIT (OUTPATIENT)
Dept: ENDOCRINOLOGY | Facility: CLINIC | Age: 82
End: 2022-03-11

## 2022-03-11 ENCOUNTER — LAB (OUTPATIENT)
Dept: LAB | Facility: HOSPITAL | Age: 82
End: 2022-03-11

## 2022-03-11 VITALS
DIASTOLIC BLOOD PRESSURE: 66 MMHG | HEART RATE: 79 BPM | HEIGHT: 62 IN | SYSTOLIC BLOOD PRESSURE: 120 MMHG | BODY MASS INDEX: 18.77 KG/M2 | OXYGEN SATURATION: 96 % | WEIGHT: 102 LBS

## 2022-03-11 DIAGNOSIS — E27.1 ADDISON DISEASE: ICD-10-CM

## 2022-03-11 DIAGNOSIS — E03.9 ACQUIRED HYPOTHYROIDISM: ICD-10-CM

## 2022-03-11 DIAGNOSIS — M85.88 OSTEOPENIA OF LUMBAR SPINE: ICD-10-CM

## 2022-03-11 DIAGNOSIS — E23.2 DIABETES INSIPIDUS: ICD-10-CM

## 2022-03-11 DIAGNOSIS — E23.2 DIABETES INSIPIDUS: Primary | ICD-10-CM

## 2022-03-11 LAB
ALBUMIN SERPL-MCNC: 5.1 G/DL (ref 3.5–5.2)
ALBUMIN/GLOB SERPL: 2.3 G/DL
ALP SERPL-CCNC: 73 U/L (ref 39–117)
ALT SERPL W P-5'-P-CCNC: 13 U/L (ref 1–33)
ANION GAP SERPL CALCULATED.3IONS-SCNC: 12.3 MMOL/L (ref 5–15)
AST SERPL-CCNC: 15 U/L (ref 1–32)
BILIRUB SERPL-MCNC: 0.7 MG/DL (ref 0–1.2)
BUN SERPL-MCNC: 15 MG/DL (ref 8–23)
BUN/CREAT SERPL: 24.2 (ref 7–25)
CALCIUM SPEC-SCNC: 10.1 MG/DL (ref 8.6–10.5)
CHLORIDE SERPL-SCNC: 105 MMOL/L (ref 98–107)
CO2 SERPL-SCNC: 23.7 MMOL/L (ref 22–29)
CREAT SERPL-MCNC: 0.62 MG/DL (ref 0.57–1)
EGFRCR SERPLBLD CKD-EPI 2021: 90.2 ML/MIN/1.73
GLOBULIN UR ELPH-MCNC: 2.2 GM/DL
GLUCOSE SERPL-MCNC: 98 MG/DL (ref 65–99)
POTASSIUM SERPL-SCNC: 4.8 MMOL/L (ref 3.5–5.2)
PROT SERPL-MCNC: 7.3 G/DL (ref 6–8.5)
SODIUM SERPL-SCNC: 141 MMOL/L (ref 136–145)
T4 FREE SERPL-MCNC: 1.32 NG/DL (ref 0.93–1.7)
TSH SERPL DL<=0.05 MIU/L-ACNC: 1.04 UIU/ML (ref 0.27–4.2)

## 2022-03-11 PROCEDURE — 99214 OFFICE O/P EST MOD 30 MIN: CPT | Performed by: INTERNAL MEDICINE

## 2022-03-11 PROCEDURE — 84439 ASSAY OF FREE THYROXINE: CPT

## 2022-03-11 PROCEDURE — 80053 COMPREHEN METABOLIC PANEL: CPT

## 2022-03-11 PROCEDURE — 84443 ASSAY THYROID STIM HORMONE: CPT

## 2022-03-11 NOTE — PROGRESS NOTES
"     Office Note      Date: 2022  Patient Name: Larissa Crenshaw  MRN: [id number]  : 1940    Chief Complaint   Patient presents with   • Hypothyroidism       History of Present Illness:   Larissa Crenshaw is a 82 y.o. female who presents for Hypothyroidism  and addisions and DI . I treat her for osteopenia as well  She is on meds for all of those.  She has had some unsteadiness on her feet. Feels like she is losing her balance.  Not like she is going to faint. Notes and intention tremor.  Some warm flushes sometimes.  Some hair loss.  She has lost 5 pounds since January.  ----      Subjective        Review of Systems:   Review of Systems   Endocrine: Positive for polydipsia.   Neurological: Positive for dizziness and tremors.       The following portions of the patient's history were reviewed and updated as appropriate: allergies, current medications, past family history, past medical history, past social history, past surgical history and problem list.    Objective     Visit Vitals  /66   Pulse 79   Ht 157.5 cm (62\")   Wt 46.3 kg (102 lb)   LMP  (LMP Unknown)   SpO2 96%   BMI 18.66 kg/m²       Labs:    CBC w/DIFF  No results found for: WBC, RBC, HGB, HCT, MCV, MCH, MCHC, RDW, RDWSD, MPV, PLT, NEUTRORELPCT, LYMPHORELPCT, MONORELPCT, EOSRELPCT, BASORELPCT, AUTOIGPER, NEUTROABS, LYMPHSABS, MONOSABS, EOSABS, BASOSABS, AUTOIGNUM, NRBC    T4  Free T4   Date Value Ref Range Status   2021 1.48 0.93 - 1.70 ng/dL Final       TSH  No results found for: TSHBASE     Physical Exam:  Physical Exam  Vitals reviewed.   Constitutional:       Appearance: Normal appearance.   HENT:      Right Ear: Tympanic membrane normal.      Ears:      Comments: lefdt tm retracted  Eyes:      Extraocular Movements: Extraocular movements intact.   Neck:      Comments: No goiter  Musculoskeletal:      Comments: tremor   Lymphadenopathy:      Cervical: No cervical adenopathy.   Neurological:      Mental Status: She is alert. "   Psychiatric:         Mood and Affect: Mood normal.         Thought Content: Thought content normal.         Judgment: Judgment normal.         Assessment / Plan      Assessment & Plan:  Problem List Items Addressed This Visit        Other    Osteopenia    Hypothyroidism    Relevant Medications    hydrocortisone (CORTEF) 5 MG tablet    levothyroxine (SYNTHROID, LEVOTHROID) 50 MCG tablet    Other Relevant Orders    T4, Free    TSH    Diabetes insipidus (HCC) - Primary    Relevant Medications    desmopressin (DDAVP) 0.2 MG tablet    Other Relevant Orders    Comprehensive Metabolic Panel    [place] disease (HCC)    Relevant Medications    hydrocortisone (CORTEF) 5 MG tablet    Other Relevant Orders    Comprehensive Metabolic Panel           Edson Mendez MD   03/11/2022

## 2022-03-23 DIAGNOSIS — E23.2 DIABETES INSIPIDUS: ICD-10-CM

## 2022-03-23 RX ORDER — DESMOPRESSIN ACETATE 0.2 MG/1
TABLET ORAL
Qty: 90 TABLET | Refills: 1 | OUTPATIENT
Start: 2022-03-23

## 2022-05-06 DIAGNOSIS — E27.1 ADDISON DISEASE: ICD-10-CM

## 2022-05-06 DIAGNOSIS — E03.9 ACQUIRED HYPOTHYROIDISM: ICD-10-CM

## 2022-05-06 RX ORDER — LEVOTHYROXINE SODIUM 0.05 MG/1
TABLET ORAL
Qty: 90 TABLET | Refills: 1 | Status: SHIPPED | OUTPATIENT
Start: 2022-05-06

## 2022-05-06 RX ORDER — HYDROCORTISONE 5 MG/1
TABLET ORAL
Qty: 270 TABLET | Refills: 1 | Status: SHIPPED | OUTPATIENT
Start: 2022-05-06

## 2022-06-28 DIAGNOSIS — E23.2 DIABETES INSIPIDUS: ICD-10-CM

## 2022-06-28 RX ORDER — DESMOPRESSIN ACETATE 0.2 MG/1
TABLET ORAL
Qty: 90 TABLET | Refills: 1 | Status: SHIPPED | OUTPATIENT
Start: 2022-06-28